# Patient Record
Sex: MALE | Race: OTHER | Employment: UNEMPLOYED | URBAN - METROPOLITAN AREA
[De-identification: names, ages, dates, MRNs, and addresses within clinical notes are randomized per-mention and may not be internally consistent; named-entity substitution may affect disease eponyms.]

---

## 2020-10-26 ENCOUNTER — VIRTUAL VISIT (OUTPATIENT)
Dept: FAMILY MEDICINE CLINIC | Age: 40
End: 2020-10-26

## 2020-10-26 DIAGNOSIS — Z00.00 VISIT FOR WELL MAN HEALTH CHECK: Primary | ICD-10-CM

## 2020-10-26 DIAGNOSIS — M54.50 LOW BACK PAIN OF OVER 3 MONTHS DURATION: ICD-10-CM

## 2020-10-26 PROCEDURE — 99442 PR PHYS/QHP TELEPHONE EVALUATION 11-20 MIN: CPT | Performed by: PHYSICIAN ASSISTANT

## 2020-10-26 NOTE — PROGRESS NOTES
I have tried to call pt and no answer. Alvaro Mata has tried mobile number as well. Home number is not working. I will try again closer to appointment time. Kelley Weldon RN    This is a new patient to the [place] [id number]. Pt used to be patient at [facility].  Kelley Weldon RN

## 2020-10-26 NOTE — PROGRESS NOTES
Assessment/Plan:    Diagnoses and all orders for this visit:    1. Visit for well man health check  -     LIPID PANEL; Future  -     METABOLIC PANEL, COMPREHENSIVE; Future  -     HEMOGLOBIN A1C WITH EAG; Future    2. Low back pain of over 3 months duration    Proper lifting techniques discussed  Pt will need a face to face visit in the future for physical exam  Flu shot and labs needed before that visit     Follow-up and Dispositions    · Return in about 3 weeks (around 2020) for can you schedule him for labs then F2F with me a few days later? MICHELLE Morfin expressed understanding of this plan. An AVS was printed and given to the patient.      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 He has no urinary sxs at all  He asks about his prostate- no family hx of prostate problems. He urinates usually once at night, no change in frequency  We talked about the new prostate screening guidelines      No past medical history on file. Current Outpatient Medications   Medication Sig Dispense Refill    PSEUDOEPH/DM/GUAIFEN/ACETAMIN (THERAFLU MAX-D SEVERE COLD-FLU PO) Take  by mouth.            No Known Allergies    Social History     Tobacco Use    Smoking status: Former Smoker     Last attempt to quit:      Years since quittin.8    Smokeless tobacco: Never Used    Tobacco comment: 3 to 4 ciggs per week for 5 years   Substance Use Topics    Alcohol use: Not Currently     Alcohol/week: 10.0 standard drinks     Types: 10 Cans of beer per week     Comment: 10 beers on the weekends/ stopped aug2020    Drug use: Not Currently     Types: Marijuana     Comment: as teenager       No family history on file. Physical Exam:     There were no vitals taken for this visit.     A&Ox3  WDWN NAD  Respirations normal and non labored

## 2020-10-29 ENCOUNTER — TELEPHONE (OUTPATIENT)
Dept: FAMILY MEDICINE CLINIC | Age: 40
End: 2020-10-29

## 2020-10-29 NOTE — TELEPHONE ENCOUNTER
Damien Marcelo  Call main office 10/29/2020 requesting his medicine patient stated that he went to pharmacy and medicine is not there patient will be waiting for a phone call .     Thank you   Merlin Loll

## 2020-10-30 DIAGNOSIS — R52 PAIN: Primary | ICD-10-CM

## 2020-10-30 RX ORDER — NAPROXEN 500 MG/1
500 TABLET ORAL 2 TIMES DAILY WITH MEALS
Qty: 60 TAB | Refills: 0 | Status: SHIPPED | OUTPATIENT
Start: 2020-10-30

## 2020-10-30 NOTE — TELEPHONE ENCOUNTER
Tc to the pt with Dax Alegria as . The pt was told the provider had sent in a new Rx for medicine for his back pain. Naproxen 500 mg the rx was reviewed with the pt. The pt was texted per his request the GoodRx coupon to his phone. The pt verbalized understanding of all instructions.  Fe Crowley RN

## 2020-10-30 NOTE — TELEPHONE ENCOUNTER
Tc to the pt with assistance of [employer] [facility] # [id number]. The pt stated he was wondering if the provider had sent in medications for his back pain. He went to the Pharmacy and there was no medication for him there. The pt's chart was reviewed. it is noted the pt  had a virtual visit on 10/26/20. There was no new medication noted as being ordered at that time. Correct lifting techniques had been reviewed with the pt, but there was no mention of medication to be prescribed for the pt at that visit. The pt was informed there were no medication orders noted in his chart. The pt is requesting the provider be sent a message asking if he could be prescribed a medicine for his back pain. The pt was asked if the back pain had gotten any worse, and he stated no. It is the same pain. No changes in the pain or any new sxs. The pt was told a message would be routed to the provider with his request. The pt could check back with is Pharmacy in a day or so, the nurse will call him with any changes to the plan.  Mary Singh RN

## 2020-11-03 ENCOUNTER — LAB ONLY (OUTPATIENT)
Dept: FAMILY MEDICINE CLINIC | Age: 40
End: 2020-11-03

## 2020-11-03 DIAGNOSIS — Z00.00 VISIT FOR WELL MAN HEALTH CHECK: ICD-10-CM

## 2020-11-03 PROCEDURE — 80053 COMPREHEN METABOLIC PANEL: CPT

## 2020-11-03 PROCEDURE — 80061 LIPID PANEL: CPT

## 2020-11-03 PROCEDURE — 83036 HEMOGLOBIN GLYCOSYLATED A1C: CPT

## 2020-11-04 ENCOUNTER — HOSPITAL ENCOUNTER (OUTPATIENT)
Dept: LAB | Age: 40
Discharge: HOME | End: 2020-11-04

## 2020-11-05 LAB
ALBUMIN SERPL-MCNC: 4.4 G/DL (ref 3.5–5)
ALBUMIN/GLOB SERPL: 1.4 {RATIO} (ref 1.1–2.2)
ALP SERPL-CCNC: 88 U/L (ref 45–117)
ALT SERPL-CCNC: 32 U/L (ref 12–78)
ANION GAP SERPL CALC-SCNC: 7 MMOL/L (ref 5–15)
AST SERPL-CCNC: 19 U/L (ref 15–37)
BILIRUB SERPL-MCNC: 0.6 MG/DL (ref 0.2–1)
BUN SERPL-MCNC: 18 MG/DL (ref 6–20)
BUN/CREAT SERPL: 19 (ref 12–20)
CALCIUM SERPL-MCNC: 9.3 MG/DL (ref 8.5–10.1)
CHLORIDE SERPL-SCNC: 107 MMOL/L (ref 97–108)
CHOLEST SERPL-MCNC: 211 MG/DL
CO2 SERPL-SCNC: 27 MMOL/L (ref 21–32)
CREAT SERPL-MCNC: 0.96 MG/DL (ref 0.7–1.3)
EST. AVERAGE GLUCOSE BLD GHB EST-MCNC: 114 MG/DL
GLOBULIN SER CALC-MCNC: 3.1 G/DL (ref 2–4)
GLUCOSE SERPL-MCNC: 86 MG/DL (ref 65–100)
HBA1C MFR BLD: 5.6 % (ref 4–5.6)
HDLC SERPL-MCNC: 44 MG/DL
HDLC SERPL: 4.8 {RATIO} (ref 0–5)
LDLC SERPL CALC-MCNC: 147.4 MG/DL (ref 0–100)
LIPID PROFILE,FLP: ABNORMAL
POTASSIUM SERPL-SCNC: 4.2 MMOL/L (ref 3.5–5.1)
PROT SERPL-MCNC: 7.5 G/DL (ref 6.4–8.2)
SODIUM SERPL-SCNC: 141 MMOL/L (ref 136–145)
TRIGL SERPL-MCNC: 98 MG/DL (ref ?–150)
VLDLC SERPL CALC-MCNC: 19.6 MG/DL

## 2020-11-11 ENCOUNTER — OFFICE VISIT (OUTPATIENT)
Dept: FAMILY MEDICINE CLINIC | Age: 40
End: 2020-11-11

## 2020-11-11 VITALS
HEIGHT: 65 IN | SYSTOLIC BLOOD PRESSURE: 120 MMHG | DIASTOLIC BLOOD PRESSURE: 72 MMHG | TEMPERATURE: 97 F | BODY MASS INDEX: 34.35 KG/M2 | WEIGHT: 206.2 LBS | HEART RATE: 96 BPM

## 2020-11-11 DIAGNOSIS — M54.59 MECHANICAL LOW BACK PAIN: ICD-10-CM

## 2020-11-11 DIAGNOSIS — E78.00 ELEVATED CHOLESTEROL: ICD-10-CM

## 2020-11-11 DIAGNOSIS — E66.3 OVERWEIGHT: Primary | ICD-10-CM

## 2020-11-11 PROCEDURE — 99213 OFFICE O/P EST LOW 20 MIN: CPT | Performed by: PHYSICIAN ASSISTANT

## 2020-11-11 NOTE — PROGRESS NOTES
Coordination of Care  1. Have you been to the ER, urgent care clinic since your last visit? Hospitalized since your last visit? No    2. Have you seen or consulted any other health care providers outside of the [address] [place] [address] since your last visit? Include any pap smears or colon screening. No    Does the patient need refills? no    Learning Assessment Complete?  yes  Depression Screening complete in the past 12 months? yes

## 2022-08-11 ENCOUNTER — OFFICE VISIT (OUTPATIENT)
Dept: FAMILY MEDICINE CLINIC | Age: 42
End: 2022-08-11

## 2022-08-11 ENCOUNTER — HOSPITAL ENCOUNTER (OUTPATIENT)
Dept: LAB | Age: 42
Discharge: HOME | End: 2022-08-11

## 2022-08-11 VITALS
TEMPERATURE: 98.2 F | WEIGHT: 214 LBS | OXYGEN SATURATION: 99 % | HEART RATE: 85 BPM | DIASTOLIC BLOOD PRESSURE: 88 MMHG | BODY MASS INDEX: 35.65 KG/M2 | SYSTOLIC BLOOD PRESSURE: 125 MMHG | HEIGHT: 65 IN

## 2022-08-11 DIAGNOSIS — R30.0 DYSURIA: ICD-10-CM

## 2022-08-11 DIAGNOSIS — L98.9 SKIN LESION OF FACE: Primary | ICD-10-CM

## 2022-08-11 DIAGNOSIS — Z13.9 ENCOUNTER FOR SCREENING: ICD-10-CM

## 2022-08-11 LAB
BILIRUB UR QL STRIP: NEGATIVE
GLUCOSE UR-MCNC: NEGATIVE MG/DL
KETONES P FAST UR STRIP-MCNC: NEGATIVE MG/DL
PH UR STRIP: 6 [PH] (ref 4.6–8)
PROT UR QL STRIP: NEGATIVE
SP GR UR STRIP: 1.02 (ref 1–1.03)
UA UROBILINOGEN AMB POC: NORMAL (ref 0.2–1)
URINALYSIS CLARITY POC: CLEAR
URINALYSIS COLOR POC: YELLOW
URINE BLOOD POC: NEGATIVE
URINE LEUKOCYTES POC: NEGATIVE
URINE NITRITES POC: NEGATIVE

## 2022-08-11 PROCEDURE — 99213 OFFICE O/P EST LOW 20 MIN: CPT | Performed by: FAMILY MEDICINE

## 2022-08-11 PROCEDURE — 87086 URINE CULTURE/COLONY COUNT: CPT

## 2022-08-11 PROCEDURE — 81002 URINALYSIS NONAUTO W/O SCOPE: CPT | Performed by: FAMILY MEDICINE

## 2022-08-11 RX ORDER — AMOXICILLIN AND CLAVULANATE POTASSIUM 875; 125 MG/1; MG/1
1 TABLET, FILM COATED ORAL EVERY 12 HOURS
Qty: 24 TABLET | Refills: 0 | Status: SHIPPED | OUTPATIENT
Start: 2022-08-11 | End: 2022-08-23

## 2022-08-11 NOTE — PROGRESS NOTES
Results for orders placed or performed in visit on 08/11/22   AMB POC URINALYSIS DIP STICK MANUAL W/O MICRO   Result Value Ref Range    Color (UA POC) Yellow     Clarity (UA POC) Clear     Glucose (UA POC) Negative Negative    Bilirubin (UA POC) Negative Negative    Ketones (UA POC) Negative Negative    Specific gravity (UA POC) 1.020 1.001 - 1.035    Blood (UA POC) Negative Negative    pH (UA POC) 6.0 4.6 - 8.0    Protein (UA POC) Negative Negative    Urobilinogen (UA POC) normal 0.2 - 1    Nitrites (UA POC) Negative Negative    Leukocyte esterase (UA POC) Negative Negative

## 2022-08-11 NOTE — PROGRESS NOTES
[facility] [address] (: 1980) is a 39 y.o. male, established patient, here for evaluation of the following chief complaint(s): Mass (Patient c/o a painful mass on chin) and Abdominal Pain (Patient c/o burning sensation in abdomen before urinating)       ASSESSMENT/PLAN:  1. Skin lesion of face  Chronic infected skin lesion. With broken lower tooth in that area Ddx could include sinus tract from dental abscess. Start with Augmentin and if not improved will send to ENT. Also recommended patient seek dental evaluation for cracked tooth. 2. Dysuria  -     CULTURE, URINE; Future  3. Encounter for screening  -     AMB POC URINALYSIS DIP STICK MANUAL W/O MICRO      Follow-up and Dispositions    Return for follow up for VV in 2 weeks for chin mass. SUBJECTIVE:  Mass on chin:  1 year of recurring draining mass on chin. Has puss drain out off/on. NO fevers, pain. Has some lower jaw discomfort in that area and a broken tooth. Burning in abdomen:  before voiding, rarely dysuria. Seems worse after intercourse. Review of Systems   Genitourinary:  Negative for difficulty urinating, flank pain, frequency, hematuria, penile discharge, testicular pain and urgency. OBJECTIVE:  Blood pressure 125/88, pulse 85, temperature 98.2 °F (36.8 °C), temperature source Temporal, height 5' 4.88\" (1.648 m), weight 214 lb (97.1 kg), SpO2 99 %. Physical Exam  CONSTITUTIONAL:  Well developed. No apparent distress. PSYCHIATRIC: Oriented to time, place, person & situation. Appropriate mood and affect. HEENT:  Lower gum with some mild swelling, broken tooth. Chin with 9 mm friable, non tender mass, draining small amount of pus, without erythema. EXTREMITIES:  No edema.         Results for orders placed or performed in visit on 22   AMB POC URINALYSIS DIP STICK MANUAL W/O MICRO   Result Value Ref Range    Color (UA POC) Yellow     Clarity (UA POC) Clear     Glucose (UA POC) Negative Negative Bilirubin (UA POC) Negative Negative    Ketones (UA POC) Negative Negative    Specific gravity (UA POC) 1.020 1.001 - 1.035    Blood (UA POC) Negative Negative    pH (UA POC) 6.0 4.6 - 8.0    Protein (UA POC) Negative Negative    Urobilinogen (UA POC) normal 0.2 - 1    Nitrites (UA POC) Negative Negative    Leukocyte esterase (UA POC) Negative Negative         An electronic signature was used to authenticate this note.   -- Hai North MD

## 2022-08-11 NOTE — PROGRESS NOTES
Verified name and . An AVS was printed and given to the patient. Reviewed all discharge instructions, including: new medications, possible side effects, goodRX coupons, and f/up appt. Allowed time for questions and patient verbalized understanding to all given instructions. Patient discharged from clinic in stable condition.

## 2022-08-12 LAB
BACTERIA SPEC CULT: NORMAL
SERVICE CMNT-IMP: NORMAL

## 2022-08-30 ENCOUNTER — VIRTUAL VISIT (OUTPATIENT)
Dept: FAMILY MEDICINE CLINIC | Age: 42
End: 2022-08-30

## 2022-08-30 DIAGNOSIS — L98.9 SKIN LESION OF FACE: Primary | ICD-10-CM

## 2022-08-30 PROCEDURE — 99441 PR PHYS/QHP TELEPHONE EVALUATION 5-10 MIN: CPT | Performed by: FAMILY MEDICINE

## 2022-08-30 NOTE — PROGRESS NOTES
1. Have you been to the ER, urgent care clinic since your last visit? Hospitalized since your last visit? NO    2. Have you seen or consulted any other health care providers outside of the [address] [place] [address] since your last visit? Include any pap smears or colon screening.  No

## 2022-08-30 NOTE — PROGRESS NOTES
[address] [place] [address] (: 1980) is a 39 y.o. male, established patient, Virtual Visit for evaluation of the following chief complaint(s):   Lesion (Skin lesion on face f/up. Augmentin given last visit.)       ASSESSMENT/PLAN:  1. Skin lesion of face  Patient feels it is resolved enough he does not feel it needs further treatment at this time. He will follow up if it swells or starts draining again. No follow-ups on file. SUBJECTIVE/OBJECTIVE:  Mass on chin:  1 year of recurring draining mass on chin. Since taking Augmentin the area has stopped draining and shrunk significantly. NO pain, bleeding. He has not seen a dentist.  Burning in abdomen:  before voiding, rarely dysuria. Seems worse after intercourse. Resolved with Augmentin. Ucx was neg. Review of Systems     No data recorded    Physical Exam    On this date 2022 I have spent 5 minutes reviewing previous notes, test results and face to face (virtual) with the patient discussing the diagnosis and importance of compliance with the treatment plan as well as documenting on the day of the visit. [address] [place] [address], was evaluated through a synchronous (real-time) audio-video encounter. The patient (or guardian if applicable) is aware that this is a billable service, which includes applicable co-pays. This Virtual Visit was conducted with patient's (and/or legal guardian's) consent. The visit was conducted pursuant to the emergency declaration under the [facility], [address] [place] [address] and the [employer] General Act. Patient identification was verified, and a caregiver was present when appropriate.   The patient was located at: Home: [id number]  [id number]  The provider was located at: Home: [unfilled]     Patient identification was verified at the start of the visit: YES    Services were provided through a phone synchronous discussion virtually to substitute for in-person clinic visit. Patient was located at home and provider was located in office or at home. An electronic signature was used to authenticate this note.   -- Angeles Perez MD

## 2022-10-03 ENCOUNTER — OFFICE VISIT (OUTPATIENT)
Dept: FAMILY MEDICINE CLINIC | Age: 42
End: 2022-10-03

## 2022-10-03 VITALS
WEIGHT: 216 LBS | DIASTOLIC BLOOD PRESSURE: 92 MMHG | HEART RATE: 74 BPM | SYSTOLIC BLOOD PRESSURE: 142 MMHG | OXYGEN SATURATION: 98 % | HEIGHT: 65 IN | BODY MASS INDEX: 35.99 KG/M2 | TEMPERATURE: 97.9 F

## 2022-10-03 DIAGNOSIS — K04.7 DENTAL ABSCESS: Primary | ICD-10-CM

## 2022-10-03 DIAGNOSIS — K08.9 POOR DENTITION: ICD-10-CM

## 2022-10-03 PROCEDURE — 99213 OFFICE O/P EST LOW 20 MIN: CPT | Performed by: PHYSICIAN ASSISTANT

## 2022-10-03 RX ORDER — CLINDAMYCIN HYDROCHLORIDE 300 MG/1
300 CAPSULE ORAL 3 TIMES DAILY
Qty: 30 CAPSULE | Refills: 0 | Status: SHIPPED | OUTPATIENT
Start: 2022-10-03 | End: 2022-10-13

## 2022-10-03 NOTE — PROGRESS NOTES
Assessment/Plan:    Diagnoses and all orders for this visit:    1. Dental abscess  -     clindamycin (CLEOCIN) 300 mg capsule; Take 1 Capsule by mouth three (3) times daily for 10 days. 2. Poor dentition    Needs to see dental ASAP    Follow-up and Dispositions    Return if symptoms worsen or fail to improve. Charlett Richardson McFerren, PA-C Lannie Apgar Valencia expressed understanding of this plan. An AVS was printed and given to the patient.      ----------------------------------------------------------------------    Chief Complaint   Patient presents with    Mass     Patient c/o lump on gums       History of Present Illness:  Pt presents with one week of bleeding painful area midline lower gum line  He states no fever  No hx of HSV oral  Very poor dentition, many broken teeth, many capped teeth   Gums are red upper and lower  He reports no hx of tobacco use  Lots of plaque adhered to remaining teeth       History reviewed. No pertinent past medical history. Current Outpatient Medications   Medication Sig Dispense Refill    clindamycin (CLEOCIN) 300 mg capsule Take 1 Capsule by mouth three (3) times daily for 10 days. 30 Capsule 0    naproxen (NAPROSYN) 500 mg tablet Take 1 Tab by mouth two (2) times daily (with meals). As needed (Patient not taking: Reported on 10/3/2022) 60 Tab 0    PSEUDOEPH/DM/GUAIFEN/ACETAMIN (THERAFLU MAX-D SEVERE COLD-FLU PO) Take  by mouth. (Patient not taking: No sig reported)         No Known Allergies    Social History     Tobacco Use    Smoking status: Former     Types: Cigarettes     Quit date:      Years since quittin.7    Smokeless tobacco: Never    Tobacco comments:     3 to 4 ciggs per week for 5 years   Vaping Use    Vaping Use: Never used   Substance Use Topics    Alcohol use:  Yes     Alcohol/week: 6.0 standard drinks     Types: 6 Cans of beer per week     Comment: OCCASIONALLY    Drug use: Not Currently     Types: Marijuana     Comment: as teenager History reviewed. No pertinent family history. Physical Exam:     Visit Vitals  BP (!) 142/92 (BP 1 Location: Right arm, BP Patient Position: Sitting)   Pulse 74   Temp 97.9 °F (36.6 °C) (Temporal)   Ht 5' 4.88\" (1.648 m)   Wt 216 lb (98 kg)   SpO2 98%   BMI 36.08 kg/m²       A&Ox3  WDWN NAD  Respirations normal and non labored  Mouth- he has a bleeding abscessed area midline (near frenulum attachment) lower gum. The gums and teeth in general are in very poor shape. He has some broken to the gumline, others missing. Many caries.  Some capped teeth upper  He has very red gingival tissue throughout   The abscess appears to be in the front only

## 2022-10-03 NOTE — PROGRESS NOTES
Coordination of Care  1. Have you been to the ER, urgent care clinic since your last visit? Hospitalized since your last visit? No    2. Have you seen or consulted any other health care providers outside of the [address] [place] [address] since your last visit? Include any pap smears or colon screening. No    Does the patient need refills? NO    Learning Assessment Complete?  yes  Depression Screening complete in the past 12 months? yes

## 2022-10-03 NOTE — PROGRESS NOTES
I have printed AVS and reviewed it with patient today. Patient verbalized understanding. I reviewed with patient medications sent to pharmacy and how the medication is taken. Patient verbalized understanding and did not have any questions concerning his medications. The patient was texted coupons for prescriptions and I explained how the coupons are used. Patient verbalized understanding. The patient was given dental resources and selected sources that he is familiar with. Patient verbalized understanding. I reviewed the patient instructions with the patient concerning dental abscesses. Patient verbalized understanding. Patient correctly stated his full name and date of birth prior to the information shared. Henry Maddox RN with the [facility] assisted with this discharge.   Harvel Kayser, RN